# Patient Record
Sex: FEMALE | Race: WHITE
[De-identification: names, ages, dates, MRNs, and addresses within clinical notes are randomized per-mention and may not be internally consistent; named-entity substitution may affect disease eponyms.]

---

## 2023-05-22 PROBLEM — Z00.00 ENCOUNTER FOR PREVENTIVE HEALTH EXAMINATION: Status: ACTIVE | Noted: 2023-05-22

## 2023-05-26 ENCOUNTER — APPOINTMENT (OUTPATIENT)
Dept: OTOLARYNGOLOGY | Facility: CLINIC | Age: 78
End: 2023-05-26
Payer: MEDICARE

## 2023-05-26 ENCOUNTER — TRANSCRIPTION ENCOUNTER (OUTPATIENT)
Age: 78
End: 2023-05-26

## 2023-05-26 ENCOUNTER — NON-APPOINTMENT (OUTPATIENT)
Age: 78
End: 2023-05-26

## 2023-05-26 DIAGNOSIS — H92.01 OTALGIA, RIGHT EAR: ICD-10-CM

## 2023-05-26 PROCEDURE — 31575 DIAGNOSTIC LARYNGOSCOPY: CPT

## 2023-05-26 PROCEDURE — 99203 OFFICE O/P NEW LOW 30 MIN: CPT | Mod: 25

## 2023-05-26 NOTE — HISTORY OF PRESENT ILLNESS
[de-identified] : Initial visit.\par Her chief complaint and reason for presenting is for cough.  She reports that she has had a cough for 2 years.  She feels that it is coming from her throat.  She quit smoking 15 years ago but did smoke for 40 years.\par She does not report hemoptysis, unexplained weight loss, dysphagia or change in voice.\par According to her primary care doctor sending her for CT scan of her chest.\par She swims and does not feel shortness of breath.

## 2023-05-26 NOTE — ASSESSMENT
[FreeTextEntry1] : Idiopathic cough.\par Consider the possibility of LPR.\par Patient education materials provided.\par We will treat with famotidine twice daily.\par Follow-up in 6 weeks.

## 2023-05-26 NOTE — PROCEDURE
[de-identified] : PROCEDURE: Flexible laryngoscopy\par SURGEON: Dr. Holguin\par INDICATIONS: He was unable to tolerate a mirror exam. Assess for laryngopharynx pharyngeal reflux. cough. head and neck mass. \par ANESTHESIA: The patient was placed in a sitting position.  Following application of the topical anesthetic and decongestant, exam was performed with a flexible scope.  The scope was passed along the right nasal floor to the nasopharynx.  It was then passed into the region of the middle meatus, middle turbinate, and sphenoethmoid region.  An identical procedure was performed on the left side.  The following findings were noted:\par \par The nasal musoca was healthy appearing and the septum was roughly midline. The middle meatus and sphenoethmoid recesses were clear bilaterally. The nasopharynx \par \par Nasopharynx: no masses, choanae patent, no adenoid tissue\par \par Base of tongue/vallecula: no masses or asymmetry\par Pharyngeal walls: symmetrical. No masses.\par Pyriform sinuses: no lesions or pooling of secretions.\par Epiglottis: normal. No edema or lesions.\par Aryepiglottic folds: normal. No lesions. \par Vocal cords: clear and mobile. No lesions. Airway patent.\par Arytenoids: no edema or erythema. \par Interarytenoid area: no edema, erythema or lesion.\par

## 2023-05-30 RX ORDER — FAMOTIDINE 40 MG/1
40 TABLET, FILM COATED ORAL
Qty: 60 | Refills: 1 | Status: ACTIVE | COMMUNITY
Start: 2023-05-30 | End: 1900-01-01

## 2023-07-10 ENCOUNTER — APPOINTMENT (OUTPATIENT)
Dept: OTOLARYNGOLOGY | Facility: CLINIC | Age: 78
End: 2023-07-10
Payer: MEDICARE

## 2023-07-10 VITALS — BODY MASS INDEX: 22.45 KG/M2 | HEIGHT: 62 IN | WEIGHT: 122 LBS

## 2023-07-10 DIAGNOSIS — R05.9 COUGH, UNSPECIFIED: ICD-10-CM

## 2023-07-10 PROCEDURE — 99214 OFFICE O/P EST MOD 30 MIN: CPT

## 2023-07-10 RX ORDER — OMEPRAZOLE 40 MG/1
40 CAPSULE, DELAYED RELEASE ORAL
Qty: 90 | Refills: 0 | Status: ACTIVE | COMMUNITY
Start: 2023-07-10 | End: 1900-01-01

## 2023-07-10 RX ORDER — IPRATROPIUM BROMIDE 42 UG/1
0.06 SPRAY NASAL 3 TIMES DAILY
Qty: 1 | Refills: 2 | Status: ACTIVE | COMMUNITY
Start: 2023-07-10 | End: 1900-01-01

## 2023-07-11 PROBLEM — R05.9 COUGH: Status: ACTIVE | Noted: 2023-05-26

## 2023-07-11 NOTE — ASSESSMENT
[FreeTextEntry1] : Persistent idiopathic cough.\par Will recommend omeprazole before bed.  We will also recommend Atrovent.  We will follow-up in 2 months.  Will consider treatment for neurogenic cough if she has not improved.

## 2023-07-11 NOTE — HISTORY OF PRESENT ILLNESS
[de-identified] : Initial visit.\par Her chief complaint and reason for presenting is for cough.  She reports that she has had a cough for 2 years.  She feels that it is coming from her throat.  She quit smoking 15 years ago but did smoke for 40 years.\par She does not report hemoptysis, unexplained weight loss, dysphagia or change in voice.\par According to her primary care doctor sending her for CT scan of her chest.\par She swims and does not feel shortness of breath. [FreeTextEntry1] : \par \par 07/11/2023 \par \par She reports she still has a daily cough and a feeling of postnasal drip.  She reports she did have a CT scan of her chest.  Apparently there was a lesion present.  According to her the pulmonologist was not concerned.  However her primary care physician sent her for a PET scan and she reports that those results were normal.

## 2023-09-15 ENCOUNTER — APPOINTMENT (OUTPATIENT)
Dept: OTOLARYNGOLOGY | Facility: CLINIC | Age: 78
End: 2023-09-15